# Patient Record
Sex: FEMALE | Race: ASIAN | Employment: FULL TIME | URBAN - METROPOLITAN AREA
[De-identification: names, ages, dates, MRNs, and addresses within clinical notes are randomized per-mention and may not be internally consistent; named-entity substitution may affect disease eponyms.]

---

## 2024-10-14 ENCOUNTER — OFFICE VISIT (OUTPATIENT)
Age: 39
End: 2024-10-14
Payer: COMMERCIAL

## 2024-10-14 VITALS
HEART RATE: 81 BPM | HEIGHT: 60 IN | TEMPERATURE: 98.9 F | DIASTOLIC BLOOD PRESSURE: 91 MMHG | BODY MASS INDEX: 28.16 KG/M2 | SYSTOLIC BLOOD PRESSURE: 131 MMHG | WEIGHT: 143.4 LBS | RESPIRATION RATE: 16 BRPM | OXYGEN SATURATION: 97 %

## 2024-10-14 DIAGNOSIS — E03.2 HYPOTHYROIDISM, IATROGENIC: ICD-10-CM

## 2024-10-14 DIAGNOSIS — R73.01 IFG (IMPAIRED FASTING GLUCOSE): ICD-10-CM

## 2024-10-14 DIAGNOSIS — Z00.00 WELL ADULT EXAM: ICD-10-CM

## 2024-10-14 DIAGNOSIS — Z00.00 WELL ADULT EXAM: Primary | ICD-10-CM

## 2024-10-14 PROBLEM — E06.3 HYPOTHYROIDISM DUE TO HASHIMOTO THYROIDITIS: Status: ACTIVE | Noted: 2024-10-14

## 2024-10-14 LAB
ALBUMIN SERPL-MCNC: 3.7 G/DL (ref 3.5–5)
ALBUMIN/GLOB SERPL: 0.9 (ref 1.1–2.2)
ALP SERPL-CCNC: 62 U/L (ref 45–117)
ALT SERPL-CCNC: 21 U/L (ref 12–78)
ANION GAP SERPL CALC-SCNC: 4 MMOL/L (ref 2–12)
AST SERPL-CCNC: 13 U/L (ref 15–37)
BILIRUB SERPL-MCNC: 0.3 MG/DL (ref 0.2–1)
BUN SERPL-MCNC: 8 MG/DL (ref 6–20)
BUN/CREAT SERPL: 9 (ref 12–20)
CALCIUM SERPL-MCNC: 9.1 MG/DL (ref 8.5–10.1)
CHLORIDE SERPL-SCNC: 107 MMOL/L (ref 97–108)
CHOLEST SERPL-MCNC: 175 MG/DL
CO2 SERPL-SCNC: 29 MMOL/L (ref 21–32)
CREAT SERPL-MCNC: 0.89 MG/DL (ref 0.55–1.02)
ERYTHROCYTE [DISTWIDTH] IN BLOOD BY AUTOMATED COUNT: 15.6 % (ref 11.5–14.5)
EST. AVERAGE GLUCOSE BLD GHB EST-MCNC: 100 MG/DL
GLOBULIN SER CALC-MCNC: 3.9 G/DL (ref 2–4)
GLUCOSE SERPL-MCNC: 81 MG/DL (ref 65–100)
HBA1C MFR BLD: 5.1 % (ref 4–5.6)
HCT VFR BLD AUTO: 35 % (ref 35–47)
HDLC SERPL-MCNC: 42 MG/DL
HDLC SERPL: 4.2 (ref 0–5)
HGB BLD-MCNC: 10.7 G/DL (ref 11.5–16)
LDLC SERPL CALC-MCNC: 106.2 MG/DL (ref 0–100)
MCH RBC QN AUTO: 19.2 PG (ref 26–34)
MCHC RBC AUTO-ENTMCNC: 30.6 G/DL (ref 30–36.5)
MCV RBC AUTO: 62.8 FL (ref 80–99)
NRBC # BLD: 0 K/UL (ref 0–0.01)
NRBC BLD-RTO: 0 PER 100 WBC
PLATELET # BLD AUTO: 313 K/UL (ref 150–400)
PMV BLD AUTO: 10.1 FL (ref 8.9–12.9)
POTASSIUM SERPL-SCNC: 3.9 MMOL/L (ref 3.5–5.1)
PROT SERPL-MCNC: 7.6 G/DL (ref 6.4–8.2)
RBC # BLD AUTO: 5.57 M/UL (ref 3.8–5.2)
SODIUM SERPL-SCNC: 140 MMOL/L (ref 136–145)
TRIGL SERPL-MCNC: 134 MG/DL
TSH SERPL DL<=0.05 MIU/L-ACNC: 6.89 UIU/ML (ref 0.36–3.74)
VLDLC SERPL CALC-MCNC: 26.8 MG/DL
WBC # BLD AUTO: 6 K/UL (ref 3.6–11)

## 2024-10-14 PROCEDURE — 99204 OFFICE O/P NEW MOD 45 MIN: CPT

## 2024-10-14 RX ORDER — LEVOTHYROXINE SODIUM 75 UG/1
75 TABLET ORAL DAILY
Qty: 90 TABLET | Refills: 3 | Status: SHIPPED
Start: 2024-10-14 | End: 2024-10-15

## 2024-10-14 SDOH — ECONOMIC STABILITY: INCOME INSECURITY: HOW HARD IS IT FOR YOU TO PAY FOR THE VERY BASICS LIKE FOOD, HOUSING, MEDICAL CARE, AND HEATING?: NOT VERY HARD

## 2024-10-14 SDOH — ECONOMIC STABILITY: FOOD INSECURITY: WITHIN THE PAST 12 MONTHS, YOU WORRIED THAT YOUR FOOD WOULD RUN OUT BEFORE YOU GOT MONEY TO BUY MORE.: NEVER TRUE

## 2024-10-14 SDOH — ECONOMIC STABILITY: FOOD INSECURITY: WITHIN THE PAST 12 MONTHS, THE FOOD YOU BOUGHT JUST DIDN'T LAST AND YOU DIDN'T HAVE MONEY TO GET MORE.: NEVER TRUE

## 2024-10-14 ASSESSMENT — ENCOUNTER SYMPTOMS
COUGH: 0
ABDOMINAL PAIN: 0
WHEEZING: 0
CONSTIPATION: 0
NAUSEA: 0
BACK PAIN: 0
VOMITING: 0
DIARRHEA: 0
CHEST TIGHTNESS: 0
SORE THROAT: 0
SINUS PAIN: 0
SHORTNESS OF BREATH: 0

## 2024-10-14 ASSESSMENT — PATIENT HEALTH QUESTIONNAIRE - PHQ9
1. LITTLE INTEREST OR PLEASURE IN DOING THINGS: NOT AT ALL
SUM OF ALL RESPONSES TO PHQ QUESTIONS 1-9: 0
SUM OF ALL RESPONSES TO PHQ QUESTIONS 1-9: 0
2. FEELING DOWN, DEPRESSED OR HOPELESS: NOT AT ALL
SUM OF ALL RESPONSES TO PHQ9 QUESTIONS 1 & 2: 0
SUM OF ALL RESPONSES TO PHQ QUESTIONS 1-9: 0
SUM OF ALL RESPONSES TO PHQ QUESTIONS 1-9: 0

## 2024-10-14 NOTE — PROGRESS NOTES
Aisha Maza is a 39 y.o. year old female who is a new patient to me today (10/14/24).     Assessment & Plan:   Below is the assessment and plan developed based on review of pertinent history, physical exam, labs, studies, and medications.    Assessment & Plan  Well adult exam    Baseline labs. Declined mammogram, provded info for local Gyn offices for routine paps/gyn exams.    Orders:    Comprehensive Metabolic Panel; Future    CBC; Future    Lipid Panel; Future    IFG (impaired fasting glucose)    Screening A1c    Orders:    Hemoglobin A1C; Future    Hypothyroidism, iatrogenic   Chronic, at goal (stable), Diagnosed with Grave's in 2007 s/p NELSON while living in [facility]. On stable dose of Levothyroxine 75mcg, refilled and will check labs.     Orders:    TSH; Future    levothyroxine (SYNTHROID) 75 MCG tablet; Take 1 tablet by mouth daily      RTC 12m    Subjective/Objective:   Aisha was seen today for New Patient     Originally from the [facility], spent some time in [place] [facility], now in [place]. Lives with her  and their 5 and 10 year-old children. 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 Has been here in [place] for about 3 months now. No subjective complaints.    Grave's disease: Diagnosed while living in [place] [facility] in 2007 s/p NELSON with resultant hypothyroidism. Takes stable dose of 75mcg, due for refill.    Screenings: Never had a mammo before, declines.    The following sections were reviewed & updated as appropriate: Problem List, Allergies, PMH, PSH, FH, and SH.    Prior to Admission medications    Medication Sig Start Date End Date Taking? Authorizing Provider   levothyroxine (SYNTHROID) 75 MCG tablet Take 1 tablet by mouth daily 10/14/24  Yes Sachin Grullon MD        Review of Systems   Constitutional:  Negative for activity change, chills, fatigue and fever.   HENT:  Negative for sinus pain and sore throat.    Eyes:  Negative for visual

## 2024-10-14 NOTE — ASSESSMENT & PLAN NOTE
Chronic, at goal (stable), Diagnosed with Grave's in 2007 s/p NELSON while living in [facility]. On stable dose of Levothyroxine 75mcg, refilled and will check labs.     Orders:    TSH; Future    levothyroxine (SYNTHROID) 75 MCG tablet; Take 1 tablet by mouth daily

## 2024-10-15 DIAGNOSIS — D50.9 IRON DEFICIENCY ANEMIA, UNSPECIFIED IRON DEFICIENCY ANEMIA TYPE: Primary | ICD-10-CM

## 2024-10-15 DIAGNOSIS — E03.2 HYPOTHYROIDISM, IATROGENIC: ICD-10-CM

## 2024-10-15 LAB
FERRITIN SERPL-MCNC: 33 NG/ML (ref 8–252)
IRON SATN MFR SERPL: 15 % (ref 20–50)
IRON SERPL-MCNC: 49 UG/DL (ref 35–150)
TIBC SERPL-MCNC: 327 UG/DL (ref 250–450)

## 2024-10-15 RX ORDER — LEVOTHYROXINE SODIUM 100 UG/1
100 TABLET ORAL DAILY
Qty: 90 TABLET | Refills: 3 | Status: SHIPPED | OUTPATIENT
Start: 2024-10-15

## 2024-10-15 RX ORDER — FERROUS SULFATE 325(65) MG
325 TABLET ORAL EVERY OTHER DAY
Qty: 90 TABLET | Refills: 3 | Status: SHIPPED | OUTPATIENT
Start: 2024-10-15

## 2024-10-15 NOTE — RESULT ENCOUNTER NOTE
Please notify patient that her TSH is elevated, so I am increasing her Levothyroxine to 100 mcg from 75 mcg. A new rx has been sent to the [facility]. She also has signs of some mild anemia, I have added-on iron studies to her labs. Otherwise, lipids, CMP, and A1c look great. Please schedule patient for follow-up in 2 months to repeat TSH after increasing the dose of her thyroid medication.

## 2024-12-16 ENCOUNTER — OFFICE VISIT (OUTPATIENT)
Age: 39
End: 2024-12-16
Payer: COMMERCIAL

## 2024-12-16 VITALS
BODY MASS INDEX: 28.16 KG/M2 | RESPIRATION RATE: 16 BRPM | HEART RATE: 74 BPM | HEIGHT: 60 IN | SYSTOLIC BLOOD PRESSURE: 136 MMHG | OXYGEN SATURATION: 97 % | TEMPERATURE: 98.3 F | DIASTOLIC BLOOD PRESSURE: 90 MMHG | WEIGHT: 143.4 LBS

## 2024-12-16 DIAGNOSIS — E03.2 HYPOTHYROIDISM, IATROGENIC: ICD-10-CM

## 2024-12-16 DIAGNOSIS — Z12.31 ENCOUNTER FOR SCREENING MAMMOGRAM FOR BREAST CANCER: ICD-10-CM

## 2024-12-16 DIAGNOSIS — E03.2 HYPOTHYROIDISM, IATROGENIC: Primary | ICD-10-CM

## 2024-12-16 PROBLEM — E06.3 HYPOTHYROIDISM DUE TO HASHIMOTO THYROIDITIS: Status: RESOLVED | Noted: 2024-10-14 | Resolved: 2024-12-16

## 2024-12-16 LAB
T4 FREE SERPL-MCNC: 1.3 NG/DL (ref 0.8–1.5)
TSH SERPL DL<=0.05 MIU/L-ACNC: 1.84 UIU/ML (ref 0.36–3.74)

## 2024-12-16 PROCEDURE — 99213 OFFICE O/P EST LOW 20 MIN: CPT

## 2024-12-16 ASSESSMENT — ENCOUNTER SYMPTOMS
VOMITING: 0
CHEST TIGHTNESS: 0
SINUS PAIN: 0
NAUSEA: 0
WHEEZING: 0
ABDOMINAL PAIN: 0
DIARRHEA: 0
CONSTIPATION: 0
BACK PAIN: 0
COUGH: 0
SHORTNESS OF BREATH: 0
SORE THROAT: 0

## 2024-12-16 NOTE — ASSESSMENT & PLAN NOTE
Chronic, at goal (stable), Continue Levothyroxine 100mcg pending labs below. BP borderline today in setting of drinking coffee/running in from rainy parking lot. Will monitor and advised lifestyle modifications.    Orders:    TSH; Future    T4, Free; Future

## 2024-12-16 NOTE — PROGRESS NOTES
Aisha Maza is a 39 y.o. female who was seen in clinic today (12/16/2024) for an acute visit.      Assessment & Plan:   Below is the assessment and plan developed based on review of pertinent history, physical exam, labs, studies, and medications.    Assessment & Plan  Hypothyroidism, iatrogenic   Chronic, at goal (stable),  Continue Levothyroxine 100mcg pending labs below. BP borderline today in setting of drinking coffee/running in from rainy parking lot. Will monitor and advised lifestyle modifications.    Orders:    TSH; Future    T4, Free; Future    Encounter for screening mammogram for breast cancer   Mammo ordered, provided list of local Gyn offices for establishment of care for routine pas/pelvic exams.    Orders:    TO TAYLA DIGITAL SCREEN BILATERAL; Future      RTC 6M for BP follow-up if thyroid labs stable, sooner PRN    Subjective/Objective:   Aisha was seen today for Follow-up (2 month F/U)    Presents to follow up on thyroid labs after increasing dose of Levothyroxine at her initial NPV on 10/14/24 for mildly elevated TSH.    Grave's disease: Diagnosed while living in [facility] in 2007 s/p NELSON with resultant hypothyroidism. TSH was 6.89 on 10/14, increased Levothyroxine from 75mcg to 100mcg. In the interim, she denies any symptoms/subjective complaints. Was asymptomatic with previously elevated TSH as well. BP borderline today, but drank coffee right before appt and ran upstairs because she was running late.    CHRISTINA: Mild iron deficiency anemia noted on initial labs, started supplement. Denies melena/hematochezia. Notes heavier periods at baseline. Has not estbalished with Gyn yet.    Prior to Admission medications    Medication Sig Start Date End Date Taking? Authorizing Provider   levothyroxine (SYNTHROID) 100 MCG tablet Take 1 tablet by mouth daily 10/15/24  Yes Sachin Grullon MD   ferrous sulfate (IRON 325) 325 (65 Fe) MG tablet Take 1 tablet by mouth every other day 10/15/24